# Patient Record
Sex: MALE | Race: BLACK OR AFRICAN AMERICAN | URBAN - METROPOLITAN AREA
[De-identification: names, ages, dates, MRNs, and addresses within clinical notes are randomized per-mention and may not be internally consistent; named-entity substitution may affect disease eponyms.]

---

## 2020-07-25 ENCOUNTER — TELEPHONE ENCOUNTER (OUTPATIENT)
Dept: URBAN - METROPOLITAN AREA CLINIC 13 | Facility: CLINIC | Age: 60
End: 2020-07-25

## 2020-07-26 ENCOUNTER — TELEPHONE ENCOUNTER (OUTPATIENT)
Dept: URBAN - METROPOLITAN AREA CLINIC 13 | Facility: CLINIC | Age: 60
End: 2020-07-26

## 2021-06-01 ENCOUNTER — TELEPHONE ENCOUNTER (OUTPATIENT)
Dept: URBAN - METROPOLITAN AREA CLINIC 113 | Facility: CLINIC | Age: 61
End: 2021-06-01

## 2021-09-14 ENCOUNTER — OFFICE VISIT (OUTPATIENT)
Dept: URBAN - METROPOLITAN AREA CLINIC 107 | Facility: CLINIC | Age: 61
End: 2021-09-14
Payer: MEDICARE

## 2021-09-14 ENCOUNTER — WEB ENCOUNTER (OUTPATIENT)
Dept: URBAN - METROPOLITAN AREA CLINIC 107 | Facility: CLINIC | Age: 61
End: 2021-09-14

## 2021-09-14 ENCOUNTER — LAB OUTSIDE AN ENCOUNTER (OUTPATIENT)
Dept: URBAN - METROPOLITAN AREA CLINIC 107 | Facility: CLINIC | Age: 61
End: 2021-09-14

## 2021-09-14 VITALS
TEMPERATURE: 98.3 F | BODY MASS INDEX: 24.48 KG/M2 | DIASTOLIC BLOOD PRESSURE: 88 MMHG | HEIGHT: 67 IN | WEIGHT: 156 LBS | HEART RATE: 76 BPM | SYSTOLIC BLOOD PRESSURE: 150 MMHG

## 2021-09-14 DIAGNOSIS — K21.9 GASTROESOPHAGEAL REFLUX DISEASE WITHOUT ESOPHAGITIS: ICD-10-CM

## 2021-09-14 DIAGNOSIS — K59.01 SLOW TRANSIT CONSTIPATION: ICD-10-CM

## 2021-09-14 DIAGNOSIS — R10.13 EPIGASTRIC ABDOMINAL PAIN: ICD-10-CM

## 2021-09-14 PROCEDURE — 99204 OFFICE O/P NEW MOD 45 MIN: CPT | Performed by: INTERNAL MEDICINE

## 2021-09-14 RX ORDER — PANTOPRAZOLE SODIUM 40 MG/1
1 TABLET TABLET, DELAYED RELEASE ORAL ONCE A DAY
Status: ACTIVE | COMMUNITY

## 2021-09-14 RX ORDER — SUCRALFATE 1 G/1
1 TABLET ON AN EMPTY STOMACH TABLET ORAL 4 TIMES DAILY
Status: ACTIVE | COMMUNITY

## 2021-09-14 RX ORDER — TIZANIDINE 2 MG/1
1 TABLET AS NEEDED TABLET ORAL
Status: ACTIVE | COMMUNITY

## 2021-09-14 NOTE — PHYSICAL EXAM GASTROINTESTINAL
Abdomen , nondistended , normal bowel sounds, soft, epigastric tender; Liver and Spleen , no hepatosplenomegaly

## 2021-09-14 NOTE — HPI-TODAY'S VISIT:
Mr Schreiber is a 60-year-old gentleman with no reported chronic medical problems presenting for evaluation of epigastric abdominal pain and GERD.  He reports having burning in his chest over the past few months.  He also reports having a burning sensation in his epigastrium radiating up to his chest.  He feels that the burning in his chest causes him to feel overall weak.  He experiences symptoms 3 to 4 days/week.  He has woken from sleep with the burning chest pain.  He has tried with no significant benefit.  Pantoprazole and Carafate he has never had an EGD.  No shortness of breath, palpatations, and exertional symptoms. He also reports having history of constipation described as having 2 bowel movements per week.  His consistency of the stool varies depending on his diet.   he reports having 2 balance per week for as long as he can remember.  He denies any red blood per rectum.  Had a colonoscopy history of colon polyps or colorectal cancer.  He presents for evaluation of several months of worsening epigastric

## 2021-09-23 ENCOUNTER — CLAIMS CREATED FROM THE CLAIM WINDOW (OUTPATIENT)
Dept: URBAN - METROPOLITAN AREA CLINIC 4 | Facility: CLINIC | Age: 61
End: 2021-09-23
Payer: MEDICARE

## 2021-09-23 ENCOUNTER — OFFICE VISIT (OUTPATIENT)
Dept: URBAN - METROPOLITAN AREA SURGERY CENTER 25 | Facility: SURGERY CENTER | Age: 61
End: 2021-09-23
Payer: MEDICARE

## 2021-09-23 DIAGNOSIS — R10.13 ABDOMINAL PAIN, EPIGASTRIC: ICD-10-CM

## 2021-09-23 DIAGNOSIS — K31.89 GASTRIC FOVEOLAR HYPERPLASIA: ICD-10-CM

## 2021-09-23 DIAGNOSIS — K31.89 NONSURGICAL DUMPING SYNDROME: ICD-10-CM

## 2021-09-23 DIAGNOSIS — K22.8 OTHER SPECIFIED DISEASES OF ESOPHAGUS: ICD-10-CM

## 2021-09-23 PROCEDURE — G8907 PT DOC NO EVENTS ON DISCHARG: HCPCS | Performed by: INTERNAL MEDICINE

## 2021-09-23 PROCEDURE — 88305 TISSUE EXAM BY PATHOLOGIST: CPT | Performed by: PATHOLOGY

## 2021-09-23 PROCEDURE — 43239 EGD BIOPSY SINGLE/MULTIPLE: CPT | Performed by: INTERNAL MEDICINE

## 2021-09-23 PROCEDURE — 88312 SPECIAL STAINS GROUP 1: CPT | Performed by: PATHOLOGY

## 2021-09-23 RX ORDER — TIZANIDINE 2 MG/1
1 TABLET AS NEEDED TABLET ORAL
Status: ACTIVE | COMMUNITY

## 2021-09-23 RX ORDER — PANTOPRAZOLE SODIUM 40 MG/1
1 TABLET TABLET, DELAYED RELEASE ORAL ONCE A DAY
Status: ACTIVE | COMMUNITY

## 2021-09-23 RX ORDER — SUCRALFATE 1 G/1
1 TABLET ON AN EMPTY STOMACH TABLET ORAL 4 TIMES DAILY
Status: ACTIVE | COMMUNITY

## 2021-10-05 ENCOUNTER — TELEPHONE ENCOUNTER (OUTPATIENT)
Dept: URBAN - METROPOLITAN AREA SURGERY CENTER 30 | Facility: SURGERY CENTER | Age: 61
End: 2021-10-05

## 2021-10-18 ENCOUNTER — TELEPHONE ENCOUNTER (OUTPATIENT)
Dept: URBAN - METROPOLITAN AREA CLINIC 113 | Facility: CLINIC | Age: 61
End: 2021-10-18

## 2021-11-05 ENCOUNTER — WEB ENCOUNTER (OUTPATIENT)
Dept: URBAN - METROPOLITAN AREA CLINIC 113 | Facility: CLINIC | Age: 61
End: 2021-11-05

## 2021-11-05 ENCOUNTER — OFFICE VISIT (OUTPATIENT)
Dept: URBAN - METROPOLITAN AREA CLINIC 113 | Facility: CLINIC | Age: 61
End: 2021-11-05
Payer: MEDICARE

## 2021-11-05 VITALS
RESPIRATION RATE: 18 BRPM | HEIGHT: 67 IN | HEART RATE: 65 BPM | BODY MASS INDEX: 24.64 KG/M2 | WEIGHT: 157 LBS | SYSTOLIC BLOOD PRESSURE: 145 MMHG | DIASTOLIC BLOOD PRESSURE: 80 MMHG | TEMPERATURE: 98.1 F

## 2021-11-05 DIAGNOSIS — K21.9 GASTROESOPHAGEAL REFLUX DISEASE WITHOUT ESOPHAGITIS: ICD-10-CM

## 2021-11-05 DIAGNOSIS — R10.13 EPIGASTRIC ABDOMINAL PAIN: ICD-10-CM

## 2021-11-05 DIAGNOSIS — R10.11 RIGHT UPPER QUADRANT ABDOMINAL PAIN: ICD-10-CM

## 2021-11-05 DIAGNOSIS — K59.01 SLOW TRANSIT CONSTIPATION: ICD-10-CM

## 2021-11-05 PROCEDURE — 99214 OFFICE O/P EST MOD 30 MIN: CPT | Performed by: NURSE PRACTITIONER

## 2021-11-05 RX ORDER — TIZANIDINE 2 MG/1
1 TABLET AS NEEDED TABLET ORAL
Status: ACTIVE | COMMUNITY

## 2021-11-05 RX ORDER — ESOMEPRAZOLE MAGNESIUM 40 MG/1
1 CAPSULE CAPSULE, DELAYED RELEASE ORAL ONCE A DAY
Qty: 90 | Refills: 3 | OUTPATIENT
Start: 2021-11-05

## 2021-11-05 RX ORDER — ESOMEPRAZOLE MAGNESIUM 40 MG/1
1 CAPSULE CAPSULE, DELAYED RELEASE ORAL ONCE A DAY
Status: ON HOLD | COMMUNITY

## 2021-11-05 RX ORDER — SUCRALFATE 1 G/1
1 TABLET ON AN EMPTY STOMACH TABLET ORAL 4 TIMES DAILY
Status: DISCONTINUED | COMMUNITY

## 2021-11-05 RX ORDER — DEXLANSOPRAZOLE 60 MG/1
1 CAPSULE CAPSULE, DELAYED RELEASE ORAL ONCE A DAY
Status: ACTIVE | COMMUNITY

## 2021-11-05 RX ORDER — PANTOPRAZOLE SODIUM 40 MG/1
1 TABLET TABLET, DELAYED RELEASE ORAL ONCE A DAY
Status: DISCONTINUED | COMMUNITY

## 2021-11-05 NOTE — HPI-TODAY'S VISIT:
61-year-old gentleman presenting for follow-up after EGD. He was last seen 9/14/2021 for evaluation of burning epigastric and lower chest pain, unimproved with pantoprazole and Carafate.  An EGD was planned.  Regarding chronic constipation, he was recommended a daily bowel regimen with MiraLAX.  A screening colonoscopy was recommended in the near future. EGD was performed on 9/23/2021.  Findings included an irregular Z-line and nonerosive gastropathy.  Gastric biopsy showed foveolar hyperplasia, negative for H. pylori. He experienced "horrible" epigastic pain October 18th-21st, described as a 7-9 on a scale of 1-10. He complains of constant burning which persisted throughout the day. This has lessened, but still occurs. The burning is often worsened on an empty stomach and subsides with eating. He states he often consumes smaller portions because he is bloating. He denies nausea or vomiting. No heartburn or regurgitation. His symptoms have minimally changed with the Dexilant samples. He complains of significant fatigue, stating he has difficulty regaining his strength after severe episodes of abdominal pain. He is having an average of 3 nonbloody stools per week. MiraLAX or prunes are helpful when needed, but he is not consistent with use. He had labs earlier today with his PCP, and is interested in pursuing imaging as suggested during a previous phone call to our office. He wants to get to the "cause" of his ongoing symptoms. Of note, he does admit to frequent use of Aleve for back and knee pain.

## 2021-12-10 ENCOUNTER — OFFICE VISIT (OUTPATIENT)
Dept: URBAN - METROPOLITAN AREA CLINIC 113 | Facility: CLINIC | Age: 61
End: 2021-12-10
Payer: MEDICARE

## 2021-12-10 VITALS
TEMPERATURE: 98.2 F | HEART RATE: 72 BPM | HEIGHT: 67 IN | WEIGHT: 156 LBS | RESPIRATION RATE: 18 BRPM | DIASTOLIC BLOOD PRESSURE: 92 MMHG | BODY MASS INDEX: 24.48 KG/M2 | SYSTOLIC BLOOD PRESSURE: 161 MMHG

## 2021-12-10 DIAGNOSIS — K21.9 GASTROESOPHAGEAL REFLUX DISEASE WITHOUT ESOPHAGITIS: ICD-10-CM

## 2021-12-10 DIAGNOSIS — K59.01 SLOW TRANSIT CONSTIPATION: ICD-10-CM

## 2021-12-10 DIAGNOSIS — R10.13 EPIGASTRIC ABDOMINAL PAIN: ICD-10-CM

## 2021-12-10 PROCEDURE — 99213 OFFICE O/P EST LOW 20 MIN: CPT | Performed by: INTERNAL MEDICINE

## 2021-12-10 RX ORDER — LORATADINE 10 MG
AS DIRECTED TABLET,DISINTEGRATING ORAL ONCE A DAY
OUTPATIENT

## 2021-12-10 RX ORDER — ESOMEPRAZOLE MAGNESIUM 40 MG/1
1 CAPSULE CAPSULE, DELAYED RELEASE ORAL ONCE A DAY
Qty: 90 | Refills: 3 | Status: ACTIVE | COMMUNITY
Start: 2021-11-05

## 2021-12-10 RX ORDER — LORATADINE 10 MG
AS DIRECTED TABLET,DISINTEGRATING ORAL ONCE A DAY
Status: ACTIVE | COMMUNITY

## 2021-12-10 RX ORDER — ESOMEPRAZOLE MAGNESIUM 40 MG/1
1 CAPSULE CAPSULE, DELAYED RELEASE ORAL ONCE A DAY
Status: DISCONTINUED | COMMUNITY

## 2021-12-10 RX ORDER — DEXLANSOPRAZOLE 60 MG/1
1 CAPSULE CAPSULE, DELAYED RELEASE ORAL ONCE A DAY
Status: DISCONTINUED | COMMUNITY

## 2021-12-10 RX ORDER — ESOMEPRAZOLE MAGNESIUM 40 MG/1
1 CAPSULE CAPSULE, DELAYED RELEASE ORAL ONCE A DAY
OUTPATIENT
Start: 2021-11-05

## 2021-12-10 RX ORDER — TIZANIDINE 2 MG/1
1 TABLET AS NEEDED TABLET ORAL
Status: DISCONTINUED | COMMUNITY

## 2021-12-10 NOTE — HPI-TODAY'S VISIT:
Mr. Schreiber is a 61-year-old gentleman presenting for follow up regarding GERD, constipation, and excessive bloating. He was last seen on 11/5/2021 for follow-up after undergoing EGD for evaluation of epigastric abdominal pain, right upper quadrant pain, GERD and constipation.  He was recommended to begin esomeprazole 40 mg daily for GERD, increase MiraLAX to daily for constipation, and obtain a CT abdomen pelvis with contrast.  Labs on 11/5/2021 show normal CBC, basic metabolic panel, liver function tests, hemoglobin A1c, TSH 1.379.  The CT abdomen pelvis with contrast on 12/6/2021 reported a suggestion of distal esophageal thickening questioning underlying esophagitis and a 2 mm left renal stone.  The CT was otherwise unremarkable.  He currently reports having a wide range of GI symptoms including some difficulty initiating swallowing described as coughing with both liquids and solids.  He otherwise denies any difficulty swallowing.  He experiences lower chest and epigastric pressure and burning sensation has been worse over the past week.  He takes esomeprazole 40 mg daily with improvement.  He reports no significant improvement with Dexilant 60 mg daily compared to esomeprazole 40 mg daily.  His constipation and bloating is overall improved using MiraLAX daily as needed.  He denies any melena or red blood per rectum.  No NSAID use and no vomiting.

## 2021-12-11 ENCOUNTER — TELEPHONE ENCOUNTER (OUTPATIENT)
Dept: URBAN - METROPOLITAN AREA CLINIC 113 | Facility: CLINIC | Age: 61
End: 2021-12-11

## 2021-12-11 LAB
A/G RATIO: 1.8
ALBUMIN: 4.9
ALKALINE PHOSPHATASE: 84
ALT (SGPT): 54
AMYLASE: 94
AST (SGOT): 27
BILIRUBIN, TOTAL: 0.5
BUN/CREATININE RATIO: 19
BUN: 17
CALCIUM: 9.3
CARBON DIOXIDE, TOTAL: 24
CHLORIDE: 101
CREATININE: 0.88
EGFR IF AFRICN AM: 106
EGFR IF NONAFRICN AM: 91
GLOBULIN, TOTAL: 2.8
GLUCOSE: 105
LIPASE: 32
POTASSIUM: 4.2
PROTEIN, TOTAL: 7.7
SODIUM: 138

## 2021-12-22 PROBLEM — 35298007: Status: ACTIVE | Noted: 2021-10-01

## 2021-12-22 PROBLEM — 266435005: Status: ACTIVE | Noted: 2021-09-14

## 2021-12-22 PROBLEM — 79922009: Status: ACTIVE | Noted: 2021-10-01

## 2022-06-16 NOTE — HPI-OTHER HISTORIES
EGD (9/23/2021): irregular Z-line and nonerosive gastropathy.  Gastric biopsy showed foveolar hyperplasia, negative for H. pylori.
no

## 2023-10-09 ENCOUNTER — OFFICE VISIT (OUTPATIENT)
Dept: URBAN - METROPOLITAN AREA CLINIC 113 | Facility: CLINIC | Age: 63
End: 2023-10-09
Payer: MEDICARE

## 2023-10-09 ENCOUNTER — TELEPHONE ENCOUNTER (OUTPATIENT)
Dept: URBAN - METROPOLITAN AREA CLINIC 13 | Facility: CLINIC | Age: 63
End: 2023-10-09

## 2023-10-09 ENCOUNTER — LAB OUTSIDE AN ENCOUNTER (OUTPATIENT)
Dept: URBAN - METROPOLITAN AREA CLINIC 113 | Facility: CLINIC | Age: 63
End: 2023-10-09

## 2023-10-09 VITALS
HEART RATE: 100 BPM | HEIGHT: 67 IN | DIASTOLIC BLOOD PRESSURE: 77 MMHG | SYSTOLIC BLOOD PRESSURE: 119 MMHG | TEMPERATURE: 97.7 F | WEIGHT: 166 LBS | BODY MASS INDEX: 26.06 KG/M2 | RESPIRATION RATE: 14 BRPM

## 2023-10-09 DIAGNOSIS — K21.9 ACID REFLUX: ICD-10-CM

## 2023-10-09 DIAGNOSIS — R07.89 ATYPICAL CHEST PAIN: ICD-10-CM

## 2023-10-09 DIAGNOSIS — R14.0 ABDOMINAL BLOATING: ICD-10-CM

## 2023-10-09 DIAGNOSIS — R19.4 CHANGE IN BOWEL HABITS: ICD-10-CM

## 2023-10-09 PROBLEM — 235595009: Status: ACTIVE | Noted: 2023-10-09

## 2023-10-09 PROCEDURE — 99214 OFFICE O/P EST MOD 30 MIN: CPT

## 2023-10-09 PROCEDURE — 99244 OFF/OP CNSLTJ NEW/EST MOD 40: CPT

## 2023-10-09 RX ORDER — DAPAGLIFLOZIN 10 MG/1
1 TABLET TABLET, FILM COATED ORAL ONCE A DAY
Status: ACTIVE | COMMUNITY

## 2023-10-09 RX ORDER — LORATADINE 10 MG
AS DIRECTED TABLET,DISINTEGRATING ORAL ONCE A DAY
Status: ACTIVE | COMMUNITY

## 2023-10-09 RX ORDER — METRONIDAZOLE 250 MG/1
1 TABLET TABLET ORAL THREE TIMES A DAY
Qty: 30 TABLET | Refills: 0 | OUTPATIENT
Start: 2023-10-09 | End: 2023-10-19

## 2023-10-09 RX ORDER — ESOMEPRAZOLE MAGNESIUM 40 MG/1
1 CAPSULE CAPSULE, DELAYED RELEASE ORAL TWICE DAILY
Qty: 180 | Refills: 1 | OUTPATIENT
Start: 2021-11-05

## 2023-10-09 RX ORDER — NORTRIPTYLINE HYDROCHLORIDE 25 MG/1
1 CAPSULE CAPSULE ORAL ONCE A DAY
Status: ACTIVE | COMMUNITY

## 2023-10-09 RX ORDER — ESOMEPRAZOLE MAGNESIUM 40 MG/1
1 CAPSULE CAPSULE, DELAYED RELEASE ORAL ONCE A DAY
Status: ACTIVE | COMMUNITY
Start: 2021-11-05

## 2023-10-09 RX ORDER — ESOMEPRAZOLE MAGNESIUM 40 MG/1
1 CAPSULE CAPSULE, DELAYED RELEASE ORAL ONCE A DAY
Status: ACTIVE | COMMUNITY

## 2023-10-09 RX ORDER — SPIRONOLACTONE 25 MG/1
1 TABLET TABLET, FILM COATED ORAL
Status: ACTIVE | COMMUNITY

## 2023-10-09 RX ORDER — RIVAROXABAN 2.5 MG/1
1 TABLET TABLET, FILM COATED ORAL TWICE A DAY
Status: ACTIVE | COMMUNITY

## 2023-10-09 RX ORDER — SACUBITRIL AND VALSARTAN 49; 51 MG/1; MG/1
1 TABLET TABLET, FILM COATED ORAL TWICE A DAY
Status: ACTIVE | COMMUNITY

## 2023-10-09 NOTE — HPI-OTHER HISTORIES
EGD (9/23/2021): irregular Z-line and nonerosive gastropathy.  Gastric biopsy showed foveolar hyperplasia, negative for H. pylori.

## 2023-10-09 NOTE — HPI-TODAY'S VISIT:
62-year-old male with a history of hypertension, GERD, constipation and bloating presents for long interval follow-up and same-day appointment.  He has been referred by Millie butler PA-C for evaluation of chest pain, nausea and elevated liver enzymes.  A copy of today's visit will be forwarded to the referring provider. He was last seen on 12/10/2021.  He underwent EGD 10/5/2021 which was essentially unremarkable.  His distal esophageal thickening seen on prior CT imaging was likely secondary to reflux esophagitis.  He was encouraged to increase his omeprazole to 40 mg once daily.  His constipation and bloating are likely secondary to functional GI disorder.  He was to continue MiraLAX 1 capful daily along with high-fiber diet.  Although clinical suspicion was low he was planned for labs to rule out hepatobiliary or pancreatic disease. Labs on 12/10/2021 revealed normal amylase, normal lipase unremarkable CMP with exception of ALT 54. Per the referral note, he experienced chest pain while in [place] last month.  He was seen at [facility] in [place] 9/23/2023.  Cardiac evaluation was normal.  He was given a GI cocktail and IV fluids and was to follow-up with his PCP and cardiologist.  He is established with Dr. Blanco again. Labs 8/29/2023:Normal WBC, normal H/H, normal MCV, normal platelet count, normal total bilirubin, normal AST, ALT of 65, normal alkaline phosphatase, elevated cholesterol 213, normal triglycerides, low vitamin D of 25.7, normal TSH.  He had an MI in April 2022. He had one stent placed hours prior to his MI. He then had a second stent placed after his MI. He has seen Dr. Anderson who has recommended a 14-day heart monitor. He is awaiting his monitor in the mail. He is being considered for a pacemaker. His EF is about 40%.   The chest pain does not occur with exertion. He does not notice a correlation with food. Prior to his chest pains starting, he was experiencing excessive flatulence. This as a foul odor. He does also admit to painful burning in his epigastrium. This fluctuates with severity. Food does not worsen the pain. He is not sure if this is affected by his bowel movements. He had similar complaints of chest pain in the past which improved with Esomeprazole.   He did have five bowel movements throughout the night last night. This comes intermittently. He has had a diarrhea predominant change in bowel habits for the last several months. He typically goes days without a bowel movement. He has had a soft bowel movement daily if not several loose stools per day. He has had intermittent blood per rectum seen on the toilet paper. He has never had a colonoscopy. He admits to excess bloating. He does have intermittent upper abdominal pain. This may radiate to his RLQ or LLQ. Denies nausea or vomiting. He does still have his gallbladder.

## 2023-10-10 ENCOUNTER — TELEPHONE ENCOUNTER (OUTPATIENT)
Dept: URBAN - METROPOLITAN AREA CLINIC 113 | Facility: CLINIC | Age: 63
End: 2023-10-10

## 2023-10-10 LAB
A/G RATIO: 1.7
ABSOLUTE BASOPHILS: 20
ABSOLUTE EOSINOPHILS: 41
ABSOLUTE LYMPHOCYTES: 1382
ABSOLUTE MONOCYTES: 428
ABSOLUTE NEUTROPHILS: 3228
ALBUMIN: 4.5
ALKALINE PHOSPHATASE: 60
ALT (SGPT): 39
AST (SGOT): 24
BASOPHILS: 0.4
BILIRUBIN, TOTAL: 0.5
BUN/CREATININE RATIO: (no result)
BUN: 15
CALCIUM: 9.4
CARBON DIOXIDE, TOTAL: 22
CHLORIDE: 103
CREATININE: 1.02
EGFR: 82
EOSINOPHILS: 0.8
GLOBULIN, TOTAL: 2.7
GLUCOSE: 108
HEMATOCRIT: 45.8
HEMOGLOBIN: 15.3
LIPASE: 31
LYMPHOCYTES: 27.1
MCH: 29.8
MCHC: 33.4
MCV: 89.1
MONOCYTES: 8.4
MPV: 10.9
NEUTROPHILS: 63.3
PLATELET COUNT: 255
POTASSIUM: 4.1
PROTEIN, TOTAL: 7.2
RDW: 13.6
RED BLOOD CELL COUNT: 5.14
SODIUM: 137
WHITE BLOOD CELL COUNT: 5.1

## 2023-10-13 ENCOUNTER — TELEPHONE ENCOUNTER (OUTPATIENT)
Dept: URBAN - METROPOLITAN AREA CLINIC 113 | Facility: CLINIC | Age: 63
End: 2023-10-13

## 2023-10-17 LAB
CALPROTECTIN, FECAL: 340
CAMPYLOBACTER SPP. AG,EIA: (no result)
CLOSTRIDIUM DIFFICILE: (no result)
GIARDIA AG, EIA, STOOL: (no result)
OVA AND PARASITES, CONC AND PERM SMEAR: (no result)
SALMONELLA AND SHIGELLA, CULTURE: (no result)
SHIGA TOXINS, EIA W/RFL TO E.COLI O157 CULTURE: (no result)

## 2023-10-18 ENCOUNTER — TELEPHONE ENCOUNTER (OUTPATIENT)
Dept: URBAN - METROPOLITAN AREA CLINIC 113 | Facility: CLINIC | Age: 63
End: 2023-10-18

## 2023-10-20 ENCOUNTER — TELEPHONE ENCOUNTER (OUTPATIENT)
Dept: URBAN - METROPOLITAN AREA CLINIC 113 | Facility: CLINIC | Age: 63
End: 2023-10-20

## 2023-10-24 ENCOUNTER — TELEPHONE ENCOUNTER (OUTPATIENT)
Dept: URBAN - METROPOLITAN AREA CLINIC 113 | Facility: CLINIC | Age: 63
End: 2023-10-24

## 2023-11-13 ENCOUNTER — OFFICE VISIT (OUTPATIENT)
Dept: URBAN - METROPOLITAN AREA SURGERY CENTER 25 | Facility: SURGERY CENTER | Age: 63
End: 2023-11-13

## 2023-11-13 ENCOUNTER — TELEPHONE ENCOUNTER (OUTPATIENT)
Dept: URBAN - METROPOLITAN AREA CLINIC 113 | Facility: CLINIC | Age: 63
End: 2023-11-13

## 2023-12-11 ENCOUNTER — OUT OF OFFICE VISIT (OUTPATIENT)
Dept: URBAN - METROPOLITAN AREA SURGERY CENTER 25 | Facility: SURGERY CENTER | Age: 63
End: 2023-12-11
Payer: MEDICARE

## 2023-12-11 ENCOUNTER — CLAIMS CREATED FROM THE CLAIM WINDOW (OUTPATIENT)
Dept: URBAN - METROPOLITAN AREA CLINIC 4 | Facility: CLINIC | Age: 63
End: 2023-12-11
Payer: MEDICARE

## 2023-12-11 DIAGNOSIS — D12.3 ADENOMA OF TRANSVERSE COLON: ICD-10-CM

## 2023-12-11 DIAGNOSIS — D12.3 ADENOMATOUS POLYP OF TRANSVERSE COLON: ICD-10-CM

## 2023-12-11 DIAGNOSIS — R10.84 GENERALIZED ABDOMINAL PAIN: ICD-10-CM

## 2023-12-11 DIAGNOSIS — R19.7 DIARRHEA, UNSPECIFIED: ICD-10-CM

## 2023-12-11 DIAGNOSIS — R10.84 ABDOMINAL CRAMPING, GENERALIZED: ICD-10-CM

## 2023-12-11 DIAGNOSIS — R19.7 ACUTE DIARRHEA: ICD-10-CM

## 2023-12-11 DIAGNOSIS — K63.89 OTHER SPECIFIED DISEASES OF INTESTINE: ICD-10-CM

## 2023-12-11 DIAGNOSIS — K63.89 APPENDICITIS EPIPLOICA: ICD-10-CM

## 2023-12-11 DIAGNOSIS — D12.3 BENIGN NEOPLASM OF TRANSVERSE COLON: ICD-10-CM

## 2023-12-11 PROCEDURE — 88342 IMHCHEM/IMCYTCHM 1ST ANTB: CPT | Performed by: PATHOLOGY

## 2023-12-11 PROCEDURE — 45385 COLONOSCOPY W/LESION REMOVAL: CPT | Performed by: INTERNAL MEDICINE

## 2023-12-11 PROCEDURE — 00811 ANES LWR INTST NDSC NOS: CPT | Performed by: ANESTHESIOLOGY

## 2023-12-11 PROCEDURE — G8907 PT DOC NO EVENTS ON DISCHARG: HCPCS | Performed by: CLINIC/CENTER

## 2023-12-11 PROCEDURE — 88305 TISSUE EXAM BY PATHOLOGIST: CPT | Performed by: PATHOLOGY

## 2023-12-11 PROCEDURE — 45380 COLONOSCOPY AND BIOPSY: CPT | Performed by: CLINIC/CENTER

## 2023-12-11 PROCEDURE — 45385 COLONOSCOPY W/LESION REMOVAL: CPT | Performed by: CLINIC/CENTER

## 2023-12-11 PROCEDURE — 88313 SPECIAL STAINS GROUP 2: CPT | Performed by: PATHOLOGY

## 2023-12-11 PROCEDURE — 45380 COLONOSCOPY AND BIOPSY: CPT | Performed by: INTERNAL MEDICINE

## 2023-12-11 RX ORDER — LORATADINE 10 MG
AS DIRECTED TABLET,DISINTEGRATING ORAL ONCE A DAY
Status: ACTIVE | COMMUNITY

## 2023-12-11 RX ORDER — DAPAGLIFLOZIN 10 MG/1
1 TABLET TABLET, FILM COATED ORAL ONCE A DAY
Status: ACTIVE | COMMUNITY

## 2023-12-11 RX ORDER — ESOMEPRAZOLE MAGNESIUM 40 MG/1
1 CAPSULE CAPSULE, DELAYED RELEASE ORAL ONCE A DAY
Status: ACTIVE | COMMUNITY

## 2023-12-11 RX ORDER — RIVAROXABAN 2.5 MG/1
1 TABLET TABLET, FILM COATED ORAL TWICE A DAY
Status: ACTIVE | COMMUNITY

## 2023-12-11 RX ORDER — ESOMEPRAZOLE MAGNESIUM 40 MG/1
1 CAPSULE CAPSULE, DELAYED RELEASE ORAL TWICE DAILY
Qty: 180 | Refills: 1 | Status: ACTIVE | COMMUNITY
Start: 2021-11-05

## 2023-12-11 RX ORDER — SACUBITRIL AND VALSARTAN 49; 51 MG/1; MG/1
1 TABLET TABLET, FILM COATED ORAL TWICE A DAY
Status: ACTIVE | COMMUNITY

## 2023-12-11 RX ORDER — SPIRONOLACTONE 25 MG/1
1 TABLET TABLET, FILM COATED ORAL
Status: ACTIVE | COMMUNITY

## 2023-12-11 RX ORDER — NORTRIPTYLINE HYDROCHLORIDE 25 MG/1
1 CAPSULE CAPSULE ORAL ONCE A DAY
Status: ACTIVE | COMMUNITY

## 2023-12-20 ENCOUNTER — TELEPHONE ENCOUNTER (OUTPATIENT)
Dept: URBAN - METROPOLITAN AREA CLINIC 113 | Facility: CLINIC | Age: 63
End: 2023-12-20

## 2024-01-22 ENCOUNTER — OFFICE VISIT (OUTPATIENT)
Dept: URBAN - METROPOLITAN AREA CLINIC 113 | Facility: CLINIC | Age: 64
End: 2024-01-22
Payer: MEDICARE

## 2024-01-22 VITALS
RESPIRATION RATE: 18 BRPM | BODY MASS INDEX: 25.74 KG/M2 | HEART RATE: 99 BPM | WEIGHT: 164 LBS | TEMPERATURE: 97.3 F | SYSTOLIC BLOOD PRESSURE: 106 MMHG | DIASTOLIC BLOOD PRESSURE: 73 MMHG | HEIGHT: 67 IN

## 2024-01-22 DIAGNOSIS — R10.13 EPIGASTRIC ABDOMINAL PAIN: ICD-10-CM

## 2024-01-22 DIAGNOSIS — R13.19 ESOPHAGEAL DYSPHAGIA: ICD-10-CM

## 2024-01-22 DIAGNOSIS — R07.89 ATYPICAL CHEST PAIN: ICD-10-CM

## 2024-01-22 DIAGNOSIS — Z79.01 CHRONIC ANTICOAGULATION: ICD-10-CM

## 2024-01-22 PROCEDURE — 99214 OFFICE O/P EST MOD 30 MIN: CPT | Performed by: INTERNAL MEDICINE

## 2024-01-22 RX ORDER — ESOMEPRAZOLE MAGNESIUM 40 MG/1
1 CAPSULE CAPSULE, DELAYED RELEASE ORAL TWICE DAILY
Qty: 180 | Refills: 1 | Status: ACTIVE | COMMUNITY
Start: 2021-11-05

## 2024-01-22 RX ORDER — SACUBITRIL AND VALSARTAN 49; 51 MG/1; MG/1
1 TABLET TABLET, FILM COATED ORAL TWICE A DAY
Status: ACTIVE | COMMUNITY

## 2024-01-22 RX ORDER — SPIRONOLACTONE 25 MG/1
1 TABLET TABLET, FILM COATED ORAL
Status: ACTIVE | COMMUNITY

## 2024-01-22 RX ORDER — TIZANIDINE HYDROCHLORIDE 4 MG/1
1 CAPSULE AS NEEDED CAPSULE, GELATIN COATED ORAL THREE TIMES A DAY
Status: ACTIVE | COMMUNITY

## 2024-01-22 RX ORDER — ESOMEPRAZOLE MAGNESIUM 40 MG/1
1 CAPSULE CAPSULE, DELAYED RELEASE ORAL TWICE DAILY
OUTPATIENT
Start: 2021-11-05

## 2024-01-22 RX ORDER — DAPAGLIFLOZIN 10 MG/1
1 TABLET TABLET, FILM COATED ORAL ONCE A DAY
Status: ACTIVE | COMMUNITY

## 2024-01-22 RX ORDER — ESOMEPRAZOLE MAGNESIUM 40 MG/1
1 CAPSULE CAPSULE, DELAYED RELEASE ORAL ONCE A DAY
Status: ON HOLD | COMMUNITY

## 2024-01-22 RX ORDER — NORTRIPTYLINE HYDROCHLORIDE 25 MG/1
1 CAPSULE CAPSULE ORAL ONCE A DAY
Status: ACTIVE | COMMUNITY

## 2024-01-22 RX ORDER — RIVAROXABAN 2.5 MG/1
1 TABLET TABLET, FILM COATED ORAL TWICE A DAY
Status: ACTIVE | COMMUNITY

## 2024-01-22 RX ORDER — LORATADINE 10 MG
AS DIRECTED TABLET,DISINTEGRATING ORAL ONCE A DAY
Status: ON HOLD | COMMUNITY

## 2024-01-22 NOTE — HPI-TODAY'S VISIT:
Mr. Schreiber is a 63-year-old gentleman with past medical history significant for coronary artery disease status post MI and PTCA with stents, atrial fibrillation on Xarelto, and cholecystectomy presenting for follow-up after undergoing colonoscopy with epigastric and lower chest pain.  The colonoscopy was performed on 12/11/2023 for the evaluation of abdominal pain and diarrhea.  The findings were notable for removal of a 5 mm hepatic flexure tubular adenoma but otherwise normal small bowel and colon status post biopsies of the colon negative for microscopic colitis.  Since that visit he saw Dr. Shaffer and underwent a cholecystectomy on 12/19/2023 for chronic cholecystitis/cholelithiasis.  He reports having some issues of epigastric and lower chest pain with some difficulty swallowing solids more than liquids.  He has been taking Nexium before meals twice daily with minimal improvement.  He denies any heartburn or regurgitation.  He has tried Gas-X with some benefit.  He denies any NSAID use.  He is having 2-3 bowel movements per week with no melena or red blood per rectum.  An EGD on 9/23/2021 for the evaluation of GERD and epigastric pain that was unremarkable except for mild gastric erythema status post biopsies negative for H. pylori.

## 2024-02-05 PROBLEM — 40890009: Status: ACTIVE | Noted: 2024-02-05

## 2024-02-05 PROBLEM — 102589003: Status: ACTIVE | Noted: 2024-02-05

## 2024-02-08 PROBLEM — 711150003: Status: ACTIVE | Noted: 2024-02-08

## 2024-04-10 ENCOUNTER — EGD (OUTPATIENT)
Dept: URBAN - METROPOLITAN AREA SURGERY CENTER 25 | Facility: SURGERY CENTER | Age: 64
End: 2024-04-10
Payer: MEDICARE

## 2024-04-10 ENCOUNTER — LAB (OUTPATIENT)
Dept: URBAN - METROPOLITAN AREA CLINIC 4 | Facility: CLINIC | Age: 64
End: 2024-04-10
Payer: MEDICARE

## 2024-04-10 DIAGNOSIS — K29.70 GASTRITIS, UNSPECIFIED, WITHOUT BLEEDING: ICD-10-CM

## 2024-04-10 DIAGNOSIS — R13.19 OTHER DYSPHAGIA: ICD-10-CM

## 2024-04-10 DIAGNOSIS — R10.13 EPIGASTRIC ABDOMINAL PAIN: ICD-10-CM

## 2024-04-10 DIAGNOSIS — K22.2 ACQUIRED ESOPHAGEAL RING: ICD-10-CM

## 2024-04-10 DIAGNOSIS — K31.89 OTHER DISEASES OF STOMACH AND DUODENUM: ICD-10-CM

## 2024-04-10 PROCEDURE — 43239 EGD BIOPSY SINGLE/MULTIPLE: CPT | Performed by: INTERNAL MEDICINE

## 2024-04-10 PROCEDURE — 88312 SPECIAL STAINS GROUP 1: CPT | Performed by: PATHOLOGY

## 2024-04-10 PROCEDURE — 43248 EGD GUIDE WIRE INSERTION: CPT | Performed by: INTERNAL MEDICINE

## 2024-04-10 PROCEDURE — 88305 TISSUE EXAM BY PATHOLOGIST: CPT | Performed by: PATHOLOGY

## 2024-04-10 RX ORDER — RIVAROXABAN 2.5 MG/1
1 TABLET TABLET, FILM COATED ORAL TWICE A DAY
Status: ACTIVE | COMMUNITY

## 2024-04-10 RX ORDER — DAPAGLIFLOZIN 10 MG/1
1 TABLET TABLET, FILM COATED ORAL ONCE A DAY
Status: ACTIVE | COMMUNITY

## 2024-04-10 RX ORDER — SACUBITRIL AND VALSARTAN 49; 51 MG/1; MG/1
1 TABLET TABLET, FILM COATED ORAL TWICE A DAY
Status: ACTIVE | COMMUNITY

## 2024-04-10 RX ORDER — TIZANIDINE HYDROCHLORIDE 4 MG/1
1 CAPSULE AS NEEDED CAPSULE, GELATIN COATED ORAL THREE TIMES A DAY
Status: ACTIVE | COMMUNITY

## 2024-04-10 RX ORDER — NORTRIPTYLINE HYDROCHLORIDE 25 MG/1
1 CAPSULE CAPSULE ORAL ONCE A DAY
Status: ACTIVE | COMMUNITY

## 2024-04-10 RX ORDER — LORATADINE 10 MG
AS DIRECTED TABLET,DISINTEGRATING ORAL ONCE A DAY
Status: ON HOLD | COMMUNITY

## 2024-04-10 RX ORDER — ESOMEPRAZOLE MAGNESIUM 40 MG/1
1 CAPSULE CAPSULE, DELAYED RELEASE ORAL ONCE A DAY
Status: ON HOLD | COMMUNITY

## 2024-04-10 RX ORDER — ESOMEPRAZOLE MAGNESIUM 40 MG/1
1 CAPSULE CAPSULE, DELAYED RELEASE ORAL TWICE DAILY
Status: ACTIVE | COMMUNITY
Start: 2021-11-05

## 2024-04-10 RX ORDER — SPIRONOLACTONE 25 MG/1
1 TABLET TABLET, FILM COATED ORAL
Status: ACTIVE | COMMUNITY

## 2024-04-30 ENCOUNTER — OV EP (OUTPATIENT)
Dept: URBAN - METROPOLITAN AREA CLINIC 113 | Facility: CLINIC | Age: 64
End: 2024-04-30

## 2024-04-30 VITALS
RESPIRATION RATE: 18 BRPM | DIASTOLIC BLOOD PRESSURE: 67 MMHG | WEIGHT: 162 LBS | TEMPERATURE: 97.7 F | HEART RATE: 76 BPM | SYSTOLIC BLOOD PRESSURE: 119 MMHG | BODY MASS INDEX: 25.43 KG/M2 | HEIGHT: 67 IN

## 2024-04-30 RX ORDER — ESOMEPRAZOLE MAGNESIUM 40 MG/1
1 CAPSULE CAPSULE, DELAYED RELEASE ORAL ONCE A DAY
Status: ON HOLD | COMMUNITY

## 2024-04-30 RX ORDER — ESOMEPRAZOLE MAGNESIUM 40 MG/1
1 CAPSULE CAPSULE, DELAYED RELEASE ORAL TWICE DAILY
Qty: 60 | Refills: 11
Start: 2021-11-05

## 2024-04-30 RX ORDER — ESOMEPRAZOLE MAGNESIUM 40 MG/1
1 CAPSULE CAPSULE, DELAYED RELEASE ORAL TWICE DAILY
Status: ACTIVE | COMMUNITY
Start: 2021-11-05

## 2024-04-30 RX ORDER — RIVAROXABAN 2.5 MG/1
1 TABLET TABLET, FILM COATED ORAL TWICE A DAY
Status: ACTIVE | COMMUNITY

## 2024-04-30 RX ORDER — NORTRIPTYLINE HYDROCHLORIDE 25 MG/1
1 CAPSULE CAPSULE ORAL ONCE A DAY
Status: ON HOLD | COMMUNITY

## 2024-04-30 RX ORDER — SACUBITRIL AND VALSARTAN 49; 51 MG/1; MG/1
1 TABLET TABLET, FILM COATED ORAL TWICE A DAY
Status: ACTIVE | COMMUNITY

## 2024-04-30 RX ORDER — ROSUVASTATIN CALCIUM 40 MG/1
1 TABLET TABLET, COATED ORAL ONCE A DAY
Status: ACTIVE | COMMUNITY

## 2024-04-30 RX ORDER — SPIRONOLACTONE 25 MG/1
1 TABLET TABLET, FILM COATED ORAL
Status: ACTIVE | COMMUNITY

## 2024-04-30 RX ORDER — LORATADINE 10 MG
AS DIRECTED TABLET,DISINTEGRATING ORAL ONCE A DAY
Status: ON HOLD | COMMUNITY

## 2024-04-30 RX ORDER — DAPAGLIFLOZIN 10 MG/1
1 TABLET TABLET, FILM COATED ORAL ONCE A DAY
Status: ACTIVE | COMMUNITY

## 2024-04-30 RX ORDER — TIZANIDINE HYDROCHLORIDE 4 MG/1
1 CAPSULE AS NEEDED CAPSULE, GELATIN COATED ORAL THREE TIMES A DAY
Status: ACTIVE | COMMUNITY

## 2024-04-30 NOTE — HPI-OTHER HISTORIES
EGD (9/23/2021): irregular Z-line and nonerosive gastropathy.  Gastric biopsy showed foveolar hyperplasia, negative for H. pylori. EGD (9/23/2021): irregular Z-line and nonerosive gastropathy.  Gastric biopsy showed foveolar hyperplasia, negative for H. pylori.  Colonoscopy (12/11/2023, abdominal pain and diarrhea). The findings were notable for removal of a 5 mm hepatic flexure tubular adenoma but otherwise normal small bowel and colon status post biopsies of the colon negative for microscopic colitis.

## 2024-04-30 NOTE — HPI-TODAY'S VISIT:
He underwent an EGD on 4/10/2024 with findings notable for a mild Schatzki's ring status post dilation with an 18 mm Savary dilator, nonerosive gastropathy status post biopsy chemical gastropathy negative for Helicobacter pylori, normal duodenum.

## 2024-04-30 NOTE — HPI-TODAY'S VISIT:
Mr. Schreiber is a 63-year-old gentleman with past medical history significant for coronary artery disease status post MI and PTCA with stents, atrial fibrillation on Xarelto, and cholecystectomy presenting for follow-up

## 2024-05-17 ENCOUNTER — TELEPHONE ENCOUNTER (OUTPATIENT)
Dept: URBAN - METROPOLITAN AREA CLINIC 113 | Facility: CLINIC | Age: 64
End: 2024-05-17

## 2024-06-01 ENCOUNTER — TELEPHONE ENCOUNTER (OUTPATIENT)
Dept: URBAN - METROPOLITAN AREA CLINIC 113 | Facility: CLINIC | Age: 64
End: 2024-06-01

## 2024-08-07 ENCOUNTER — DASHBOARD ENCOUNTERS (OUTPATIENT)
Age: 64
End: 2024-08-07

## 2024-08-07 ENCOUNTER — OFFICE VISIT (OUTPATIENT)
Dept: URBAN - METROPOLITAN AREA CLINIC 113 | Facility: CLINIC | Age: 64
End: 2024-08-07
Payer: MEDICARE

## 2024-08-07 VITALS
HEIGHT: 67 IN | BODY MASS INDEX: 24.27 KG/M2 | TEMPERATURE: 97.5 F | RESPIRATION RATE: 18 BRPM | WEIGHT: 154.6 LBS | DIASTOLIC BLOOD PRESSURE: 75 MMHG | SYSTOLIC BLOOD PRESSURE: 120 MMHG | HEART RATE: 82 BPM

## 2024-08-07 DIAGNOSIS — K62.89 PROCTALGIA: ICD-10-CM

## 2024-08-07 DIAGNOSIS — K21.9 GASTROESOPHAGEAL REFLUX DISEASE WITHOUT ESOPHAGITIS: ICD-10-CM

## 2024-08-07 DIAGNOSIS — K59.04 CHRONIC IDIOPATHIC CONSTIPATION: ICD-10-CM

## 2024-08-07 PROCEDURE — 99214 OFFICE O/P EST MOD 30 MIN: CPT | Performed by: INTERNAL MEDICINE

## 2024-08-07 RX ORDER — ESOMEPRAZOLE MAGNESIUM 40 MG/1
1 CAPSULE CAPSULE, DELAYED RELEASE ORAL TWICE DAILY
OUTPATIENT
Start: 2021-11-05

## 2024-08-07 RX ORDER — ESOMEPRAZOLE MAGNESIUM 40 MG/1
1 CAPSULE CAPSULE, DELAYED RELEASE ORAL TWICE DAILY
Qty: 60 | Refills: 11 | Status: ACTIVE | COMMUNITY
Start: 2021-11-05

## 2024-08-07 RX ORDER — TIZANIDINE HYDROCHLORIDE 4 MG/1
1 CAPSULE AS NEEDED CAPSULE, GELATIN COATED ORAL THREE TIMES A DAY
Status: ACTIVE | COMMUNITY

## 2024-08-07 RX ORDER — ESOMEPRAZOLE MAGNESIUM 40 MG/1
1 CAPSULE CAPSULE, DELAYED RELEASE ORAL ONCE A DAY
Status: ON HOLD | COMMUNITY

## 2024-08-07 RX ORDER — LORATADINE 10 MG
AS DIRECTED TABLET,DISINTEGRATING ORAL ONCE A DAY
Status: ON HOLD | COMMUNITY

## 2024-08-07 RX ORDER — SACUBITRIL AND VALSARTAN 24; 26 MG/1; MG/1
1 TABLET TABLET, FILM COATED ORAL TWICE A DAY
Status: ACTIVE | COMMUNITY

## 2024-08-07 RX ORDER — NORTRIPTYLINE HYDROCHLORIDE 25 MG/1
1 CAPSULE CAPSULE ORAL ONCE A DAY
Status: ON HOLD | COMMUNITY

## 2024-08-07 RX ORDER — ROSUVASTATIN CALCIUM 40 MG/1
1 TABLET TABLET, COATED ORAL ONCE A DAY
Status: ACTIVE | COMMUNITY

## 2024-08-07 RX ORDER — RIVAROXABAN 2.5 MG/1
1 TABLET TABLET, FILM COATED ORAL TWICE A DAY
Status: ACTIVE | COMMUNITY

## 2024-08-07 RX ORDER — DAPAGLIFLOZIN 10 MG/1
1 TABLET TABLET, FILM COATED ORAL ONCE A DAY
Status: ACTIVE | COMMUNITY

## 2024-08-07 RX ORDER — SPIRONOLACTONE 25 MG/1
1 TABLET TABLET, FILM COATED ORAL
Status: ACTIVE | COMMUNITY

## 2024-08-07 NOTE — HPI-OTHER HISTORIES
EGD on 4/10/2024 with findings notable for a mild Schatzki's ring status post dilation with an 18 mm Savary dilator, nonerosive gastropathy status post biopsy chemical gastropathy negative for Helicobacter pylori, normal duodenum. Labs on 3/6/2024 notable for WBC 5.52, hemoglobin 15.9, MCV 91, platelets 269; sodium 139, potassium 4.5, chloride 104, CO2 26.7, BUN 14, creatinine 1.1; total bilirubin 0.4, alkaline phosphatase 74, AST 34, ALT 70, albumin 4.7, total protein 7.3.  Abdominal ultrasound (10/17/2023) cholelithiasis with mild gallbladder wall thickening, increased hepatic echotexture consistent with hepatic steatosis, normal caliber common bile duct (4.8 mm)  EGD (9/23/2021): irregular Z-line and nonerosive gastropathy.  Gastric biopsy showed foveolar hyperplasia, negative for H. pylori. EGD (9/23/2021): irregular Z-line and nonerosive gastropathy.  Gastric biopsy showed foveolar hyperplasia, negative for H. pylori.  Colonoscopy (12/11/2023, abdominal pain and diarrhea). The findings were notable for removal of a 5 mm hepatic flexure tubular adenoma but otherwise normal small bowel and colon status post biopsies of the colon negative for microscopic colitis.

## 2024-08-07 NOTE — HPI-TODAY'S VISIT:
Mr. Schreiber is a 63-year-old gentleman with past medical history significant for coronary artery disease status post MI and PTCA with stents, atrial fibrillation on Xarelto, and cholecystectomy presenting for follow-up.  He was last seen in the office on 4/30/2024 for follow-up regarding GERD, esophageal dysphagia, slow transit constipation and elevated liver enzymes.  He had a recent EGD that was unremarkable except for mild Schatzki's ring status post dilation.  Despite taking esomeprazole 40 mg twice daily, he continued to report of cough.  He declined a trial of Xyzal pending MRI of his brain.  His constipation was well-controlled on MiraLAX.  Labs showed a mild elevation in liver enzymes felt to be secondary to nonalcoholic fatty liver disease.  He reports having occasional chest discomfort without any obvious trigger.  No heartburn or difficulty swallowing.  He reports that he has been having a bowel movement about every other day that is fairly normal consistency taking MiraLAX.  No blood per rectum.  He has had pain in his "tailbone" and has been very bothersome and exacerbated by sitting.  He reports the pain is been chronic.  He does not recall any trauma such as a fall.  He is undergoing cystoscopy for the evaluation of hematuria.  His CT abdomen pelvis (6/7/2024) was negative except for constipation and to kidney stones, large prostate.

## 2024-08-21 PROBLEM — 77880009: Status: ACTIVE | Noted: 2024-08-21

## 2024-08-21 PROBLEM — 82934008: Status: ACTIVE | Noted: 2024-08-21

## 2024-08-23 ENCOUNTER — TELEPHONE ENCOUNTER (OUTPATIENT)
Dept: URBAN - METROPOLITAN AREA CLINIC 113 | Facility: CLINIC | Age: 64
End: 2024-08-23

## 2024-09-05 ENCOUNTER — TELEPHONE ENCOUNTER (OUTPATIENT)
Dept: URBAN - METROPOLITAN AREA CLINIC 113 | Facility: CLINIC | Age: 64
End: 2024-09-05

## 2024-09-24 ENCOUNTER — TELEPHONE ENCOUNTER (OUTPATIENT)
Dept: URBAN - METROPOLITAN AREA CLINIC 113 | Facility: CLINIC | Age: 64
End: 2024-09-24

## 2024-09-24 RX ORDER — POLYETHYLENE GLYCOL 3350, SODIUM SULFATE ANHYDROUS, SODIUM BICARBONATE, SODIUM CHLORIDE, POTASSIUM CHLORIDE 236; 22.74; 6.74; 5.86; 2.97 G/4L; G/4L; G/4L; G/4L; G/4L
AS DIRECTED POWDER, FOR SOLUTION ORAL ONCE
Qty: 1 | Refills: 0 | OUTPATIENT
Start: 2024-09-29 | End: 2024-09-30

## 2024-09-29 PROBLEM — 442182001: Status: ACTIVE | Noted: 2024-09-29

## 2024-10-14 ENCOUNTER — TELEPHONE ENCOUNTER (OUTPATIENT)
Dept: URBAN - METROPOLITAN AREA SURGERY CENTER 25 | Facility: SURGERY CENTER | Age: 64
End: 2024-10-14

## 2024-10-28 ENCOUNTER — TELEPHONE ENCOUNTER (OUTPATIENT)
Dept: URBAN - METROPOLITAN AREA CLINIC 113 | Facility: CLINIC | Age: 64
End: 2024-10-28

## 2024-10-29 ENCOUNTER — TELEPHONE ENCOUNTER (OUTPATIENT)
Dept: URBAN - METROPOLITAN AREA CLINIC 113 | Facility: CLINIC | Age: 64
End: 2024-10-29

## 2024-10-29 ENCOUNTER — OFFICE VISIT (OUTPATIENT)
Dept: URBAN - METROPOLITAN AREA SURGERY CENTER 25 | Facility: SURGERY CENTER | Age: 64
End: 2024-10-29

## 2024-11-04 ENCOUNTER — OFFICE VISIT (OUTPATIENT)
Dept: URBAN - METROPOLITAN AREA SURGERY CENTER 25 | Facility: SURGERY CENTER | Age: 64
End: 2024-11-04
Payer: COMMERCIAL

## 2024-11-04 DIAGNOSIS — K57.30 COLON, DIVERTICULOSIS: ICD-10-CM

## 2024-11-04 DIAGNOSIS — K64.0 FIRST DEGREE HEMORRHOIDS: ICD-10-CM

## 2024-11-04 DIAGNOSIS — R93.3 ABNORMAL FINDINGS ON DIAGNOSTIC IMAGING OF OTHER PARTS OF DIGESTIVE TRACT: ICD-10-CM

## 2024-11-04 DIAGNOSIS — R93.3 ABNORMAL FINDINGS ON DIAGNOSTIC IMAGING OF OTHER PARTS OF DIGESTIVE TRACT.: ICD-10-CM

## 2024-11-04 PROCEDURE — 00811 ANES LWR INTST NDSC NOS: CPT | Performed by: ANESTHESIOLOGY

## 2024-11-04 PROCEDURE — 00811 ANES LWR INTST NDSC NOS: CPT | Performed by: NURSE ANESTHETIST, CERTIFIED REGISTERED

## 2024-11-04 PROCEDURE — 45330 DIAGNOSTIC SIGMOIDOSCOPY: CPT | Performed by: INTERNAL MEDICINE

## 2024-11-04 RX ORDER — DAPAGLIFLOZIN 10 MG/1
1 TABLET TABLET, FILM COATED ORAL ONCE A DAY
Status: ACTIVE | COMMUNITY

## 2024-11-04 RX ORDER — SPIRONOLACTONE 25 MG/1
1 TABLET TABLET, FILM COATED ORAL
Status: ACTIVE | COMMUNITY

## 2024-11-04 RX ORDER — SACUBITRIL AND VALSARTAN 24; 26 MG/1; MG/1
1 TABLET TABLET, FILM COATED ORAL TWICE A DAY
Status: ACTIVE | COMMUNITY

## 2024-11-04 RX ORDER — NORTRIPTYLINE HYDROCHLORIDE 25 MG/1
1 CAPSULE CAPSULE ORAL ONCE A DAY
Status: ON HOLD | COMMUNITY

## 2024-11-04 RX ORDER — RIVAROXABAN 2.5 MG/1
1 TABLET TABLET, FILM COATED ORAL TWICE A DAY
Status: ACTIVE | COMMUNITY

## 2024-11-04 RX ORDER — ESOMEPRAZOLE MAGNESIUM 40 MG/1
1 CAPSULE CAPSULE, DELAYED RELEASE ORAL TWICE DAILY
Status: ACTIVE | COMMUNITY
Start: 2021-11-05

## 2024-11-04 RX ORDER — LORATADINE 10 MG
AS DIRECTED TABLET,DISINTEGRATING ORAL ONCE A DAY
Status: ON HOLD | COMMUNITY

## 2024-11-04 RX ORDER — ESOMEPRAZOLE MAGNESIUM 40 MG/1
1 CAPSULE CAPSULE, DELAYED RELEASE ORAL ONCE A DAY
Status: ON HOLD | COMMUNITY

## 2024-11-04 RX ORDER — TIZANIDINE HYDROCHLORIDE 4 MG/1
1 CAPSULE AS NEEDED CAPSULE, GELATIN COATED ORAL THREE TIMES A DAY
Status: ACTIVE | COMMUNITY

## 2024-11-04 RX ORDER — ROSUVASTATIN CALCIUM 40 MG/1
1 TABLET TABLET, COATED ORAL ONCE A DAY
Status: ACTIVE | COMMUNITY

## 2024-11-14 ENCOUNTER — OFFICE VISIT (OUTPATIENT)
Dept: URBAN - METROPOLITAN AREA SURGERY CENTER 25 | Facility: SURGERY CENTER | Age: 64
End: 2024-11-14

## 2024-11-21 ENCOUNTER — OFFICE VISIT (OUTPATIENT)
Dept: URBAN - METROPOLITAN AREA CLINIC 113 | Facility: CLINIC | Age: 64
End: 2024-11-21

## 2024-11-21 VITALS
RESPIRATION RATE: 12 BRPM | WEIGHT: 156 LBS | HEART RATE: 64 BPM | HEIGHT: 67 IN | BODY MASS INDEX: 24.48 KG/M2 | DIASTOLIC BLOOD PRESSURE: 61 MMHG | TEMPERATURE: 98.2 F | SYSTOLIC BLOOD PRESSURE: 109 MMHG

## 2024-11-21 RX ORDER — DAPAGLIFLOZIN 10 MG/1
1 TABLET TABLET, FILM COATED ORAL ONCE A DAY
Status: ACTIVE | COMMUNITY

## 2024-11-21 RX ORDER — CYCLOBENZAPRINE HYDROCHLORIDE 10 MG/1
1 TABLET AT BEDTIME AS NEEDED TABLET, FILM COATED ORAL ONCE A DAY
Status: ACTIVE | COMMUNITY

## 2024-11-21 RX ORDER — SPIRONOLACTONE 25 MG/1
1 TABLET TABLET, FILM COATED ORAL
Status: ACTIVE | COMMUNITY

## 2024-11-21 RX ORDER — SACUBITRIL AND VALSARTAN 24; 26 MG/1; MG/1
1 TABLET TABLET, FILM COATED ORAL TWICE A DAY
Status: ACTIVE | COMMUNITY

## 2024-11-21 RX ORDER — ROSUVASTATIN CALCIUM 40 MG/1
1 TABLET TABLET, COATED ORAL ONCE A DAY
Status: ACTIVE | COMMUNITY

## 2024-11-21 RX ORDER — ESOMEPRAZOLE MAGNESIUM 40 MG/1
1 CAPSULE CAPSULE, DELAYED RELEASE ORAL TWICE DAILY
Status: ON HOLD | COMMUNITY
Start: 2021-11-05

## 2024-11-21 RX ORDER — METOPROLOL SUCCINATE 25 MG/1
1 TABLET TABLET, FILM COATED, EXTENDED RELEASE ORAL ONCE A DAY
Status: ACTIVE | COMMUNITY

## 2024-11-21 RX ORDER — TIZANIDINE HYDROCHLORIDE 4 MG/1
1 CAPSULE AS NEEDED CAPSULE, GELATIN COATED ORAL THREE TIMES A DAY
Status: ON HOLD | COMMUNITY

## 2024-11-21 RX ORDER — RIVAROXABAN 2.5 MG/1
1 TABLET TABLET, FILM COATED ORAL TWICE A DAY
Status: ACTIVE | COMMUNITY

## 2024-11-21 RX ORDER — NORTRIPTYLINE HYDROCHLORIDE 25 MG/1
1 CAPSULE CAPSULE ORAL ONCE A DAY
Status: ON HOLD | COMMUNITY

## 2024-11-21 RX ORDER — LORATADINE 10 MG
AS DIRECTED TABLET,DISINTEGRATING ORAL ONCE A DAY
Status: ACTIVE | COMMUNITY

## 2024-11-21 RX ORDER — ESOMEPRAZOLE MAGNESIUM 40 MG/1
1 CAPSULE CAPSULE, DELAYED RELEASE ORAL ONCE A DAY
Status: ON HOLD | COMMUNITY

## 2024-11-21 NOTE — HPI-TODAY'S VISIT:
Mr. Schreiber is a 64-year-old gentleman with past medical history significant for coronary artery disease status post MI and PTCA with stents, atrial fibrillation on Xarelto, and cholecystectomy presenting for follow-up after flexible sigmoidoscopy.

## 2025-01-31 ENCOUNTER — OFFICE VISIT (OUTPATIENT)
Dept: URBAN - METROPOLITAN AREA CLINIC 113 | Facility: CLINIC | Age: 65
End: 2025-01-31
Payer: COMMERCIAL

## 2025-01-31 VITALS
RESPIRATION RATE: 16 BRPM | HEART RATE: 84 BPM | BODY MASS INDEX: 24.33 KG/M2 | WEIGHT: 155 LBS | HEIGHT: 67 IN | SYSTOLIC BLOOD PRESSURE: 111 MMHG | TEMPERATURE: 97.7 F | DIASTOLIC BLOOD PRESSURE: 71 MMHG

## 2025-01-31 DIAGNOSIS — K62.89 PROCTALGIA: ICD-10-CM

## 2025-01-31 DIAGNOSIS — R14.1 ABDOMINAL GAS PAIN: ICD-10-CM

## 2025-01-31 DIAGNOSIS — R79.89 ELEVATED LFTS: ICD-10-CM

## 2025-01-31 DIAGNOSIS — K59.01 SLOW TRANSIT CONSTIPATION: ICD-10-CM

## 2025-01-31 DIAGNOSIS — K21.9 GASTROESOPHAGEAL REFLUX DISEASE WITHOUT ESOPHAGITIS: ICD-10-CM

## 2025-01-31 DIAGNOSIS — R10.13 EPIGASTRIC ABDOMINAL PAIN: ICD-10-CM

## 2025-01-31 PROCEDURE — 99214 OFFICE O/P EST MOD 30 MIN: CPT

## 2025-01-31 RX ORDER — ESOMEPRAZOLE MAGNESIUM 40 MG/1
1 CAPSULE CAPSULE, DELAYED RELEASE ORAL TWICE DAILY
Status: ACTIVE | COMMUNITY
Start: 2021-11-05

## 2025-01-31 RX ORDER — RIVAROXABAN 2.5 MG/1
1 TABLET TABLET, FILM COATED ORAL TWICE A DAY
Status: ACTIVE | COMMUNITY

## 2025-01-31 RX ORDER — SACUBITRIL AND VALSARTAN 24; 26 MG/1; MG/1
1 TABLET TABLET, FILM COATED ORAL TWICE A DAY
Status: ACTIVE | COMMUNITY

## 2025-01-31 RX ORDER — LORATADINE 10 MG
AS DIRECTED TABLET,DISINTEGRATING ORAL ONCE A DAY
Status: ACTIVE | COMMUNITY

## 2025-01-31 RX ORDER — DAPAGLIFLOZIN 10 MG/1
1 TABLET TABLET, FILM COATED ORAL ONCE A DAY
Status: ACTIVE | COMMUNITY

## 2025-01-31 RX ORDER — ROSUVASTATIN CALCIUM 40 MG/1
1 TABLET TABLET, COATED ORAL ONCE A DAY
Status: ACTIVE | COMMUNITY

## 2025-01-31 RX ORDER — SPIRONOLACTONE 25 MG/1
1 TABLET TABLET, FILM COATED ORAL
Status: ACTIVE | COMMUNITY

## 2025-01-31 RX ORDER — CYCLOBENZAPRINE HYDROCHLORIDE 10 MG/1
1 TABLET AT BEDTIME AS NEEDED TABLET, FILM COATED ORAL ONCE A DAY
Status: ACTIVE | COMMUNITY

## 2025-01-31 RX ORDER — METOPROLOL SUCCINATE 25 MG/1
1 TABLET TABLET, FILM COATED, EXTENDED RELEASE ORAL ONCE A DAY
Status: ACTIVE | COMMUNITY

## 2025-01-31 NOTE — HPI-TODAY'S VISIT:
[facility]    Mr. Schreiber is a 64-year-old gentleman with past medical history significant for coronary artery disease status post MI and PTCA with stents, atrial fibrillation on Xarelto, CHF on Entresto and cholecystectomy presenting for evaluation of elevated liver enzymes.  He was last in office on 11/21/2024  regarding chronic  Proctalgia.  MRI of the pelvis showed some mild rectal wall thickening which was likely related to constipation as follow-up flexible sigmoidoscopy did not identify any mucosal abnormality.  He reports his constipation is well-controlled MiraLAX daily.  GERD symptoms fairly well-controlled on esomeprazole 40 mg twice daily.  He reported no benefit after trial of plasma.  Labs available for review from 9/24/2025 show normal CBC, hemoglobin A1c 6.6%, AST 54, ALT 91, alkaline phosphatase 49, total bilirubin 0.8.  Today he reports he was taken to his cardiologist office to the hospital. He reports he had an abdominal ultrasound this morning at [facility]. He had blood work at his PCP office a couple days. He is still having issues with rectal pain, he had an xray and reports his PCP said he had arthritis on this, and is referring him to a orthopedic office. He reports a sensation of his heart fluttering and feels it might be related to gas. troponin  Was then further elevated at 12.  ProBNP was elevated as well. Given the concern for non-ST elevation MI, he was transferred to [facility] for further management. Given markedly elevated lactic acid as well, he was placed in ICU here at [facility].        Hospital Course   Rudy Beth [facility]    [address]  [address]  [place] [address]  [phone]     JAMI MCGOVERN MD

## 2025-02-03 ENCOUNTER — TELEPHONE ENCOUNTER (OUTPATIENT)
Dept: URBAN - METROPOLITAN AREA CLINIC 113 | Facility: CLINIC | Age: 65
End: 2025-02-03

## 2025-03-14 ENCOUNTER — OFFICE VISIT (OUTPATIENT)
Dept: URBAN - METROPOLITAN AREA CLINIC 113 | Facility: CLINIC | Age: 65
End: 2025-03-14

## 2025-03-19 ENCOUNTER — TELEPHONE ENCOUNTER (OUTPATIENT)
Dept: URBAN - METROPOLITAN AREA CLINIC 113 | Facility: CLINIC | Age: 65
End: 2025-03-19

## 2025-04-01 ENCOUNTER — OFFICE VISIT (OUTPATIENT)
Dept: URBAN - METROPOLITAN AREA CLINIC 113 | Facility: CLINIC | Age: 65
End: 2025-04-01
Payer: COMMERCIAL

## 2025-04-01 DIAGNOSIS — K59.01 SLOW TRANSIT CONSTIPATION: ICD-10-CM

## 2025-04-01 DIAGNOSIS — K62.89 PROCTALGIA: ICD-10-CM

## 2025-04-01 DIAGNOSIS — R14.1 ABDOMINAL GAS PAIN: ICD-10-CM

## 2025-04-01 DIAGNOSIS — K21.9 GASTROESOPHAGEAL REFLUX DISEASE WITHOUT ESOPHAGITIS: ICD-10-CM

## 2025-04-01 DIAGNOSIS — R79.89 ELEVATED LFTS: ICD-10-CM

## 2025-04-01 PROCEDURE — 99214 OFFICE O/P EST MOD 30 MIN: CPT

## 2025-04-01 RX ORDER — ROSUVASTATIN CALCIUM 40 MG/1
1 TABLET TABLET, COATED ORAL ONCE A DAY
Status: ACTIVE | COMMUNITY

## 2025-04-01 RX ORDER — METOPROLOL SUCCINATE 25 MG/1
1 TABLET TABLET, FILM COATED, EXTENDED RELEASE ORAL ONCE A DAY
Status: ACTIVE | COMMUNITY

## 2025-04-01 RX ORDER — SPIRONOLACTONE 25 MG/1
1 TABLET TABLET, FILM COATED ORAL
Status: ACTIVE | COMMUNITY

## 2025-04-01 RX ORDER — CYCLOBENZAPRINE HYDROCHLORIDE 10 MG/1
1 TABLET AT BEDTIME AS NEEDED TABLET, FILM COATED ORAL ONCE A DAY
Status: ACTIVE | COMMUNITY

## 2025-04-01 RX ORDER — ESOMEPRAZOLE MAGNESIUM 40 MG/1
1 CAPSULE CAPSULE, DELAYED RELEASE ORAL TWICE DAILY
Status: ACTIVE | COMMUNITY
Start: 2021-11-05

## 2025-04-01 RX ORDER — ACETAMINOPHEN AND CODEINE PHOSPHATE 300; 30 MG/1; MG/1
1 TABLET AS NEEDED TABLET ORAL
Status: ACTIVE | COMMUNITY

## 2025-04-01 RX ORDER — SACUBITRIL AND VALSARTAN 24; 26 MG/1; MG/1
1 TABLET TABLET, FILM COATED ORAL TWICE A DAY
Status: ACTIVE | COMMUNITY

## 2025-04-01 RX ORDER — LORATADINE 10 MG
AS DIRECTED TABLET,DISINTEGRATING ORAL ONCE A DAY
Status: ACTIVE | COMMUNITY

## 2025-04-01 RX ORDER — DAPAGLIFLOZIN 10 MG/1
1 TABLET TABLET, FILM COATED ORAL ONCE A DAY
Status: ACTIVE | COMMUNITY

## 2025-04-01 RX ORDER — DOXYCYCLINE 40 MG/1
1 CAPSULE IN THE MORNING ON AN EMPTY STOMACH CAPSULE ORAL ONCE A DAY
Status: ACTIVE | COMMUNITY

## 2025-04-01 RX ORDER — RIVAROXABAN 2.5 MG/1
1 TABLET TABLET, FILM COATED ORAL TWICE A DAY
Status: ACTIVE | COMMUNITY

## 2025-04-01 NOTE — HPI-TODAY'S VISIT:
Mr. Schreiber is a 64-year-old gentleman with a past medical history significant for coronary artery disease status post MI and PTCA with stents, atrial fibrillation on Xarelto, CHF on Entresto and cholecystectomy presenting for follow-up regarding elevated liver enzymes.  He was last seen office on 1/31/2025 for evaluation of elevated liver enzymes.  Review of labs show elevations of transaminases.  Review of records reveal he was seen at [facility] on 1/24.  Negative chest x-ray and CT of the chest that showed bronchial wall thickening consistent with chronic or acute bronchitis, lumbar spine showed mild degenerative arthritis.  He was seen by his PCP 2 days prior to our visit labs from 1/24 show AST 54, ALT 91.  Plan for repeat LFTs in 4 to 6 weeks.  He reports he had had an abdominal ultrasound that morning at [facility] this was to be requested for review.  It appeared his PCP had also ordered VIJAYA and hepatitis panel these labs were requested further recommendations pending review of labs and imaging.  Regarding his rectal pain, consider this could be musculoskeletal etiology given recent imaging showed degenerative arthritis.  He will follow-up with PCP and orthopedist.  Increase in gas and bloating describes sensation of heart fluttering he reports that his cardiologist has plans to refer him to have a defibrillator placed.  These records will be requested.  He will trial FD guard until completion of cardiac workup.  Repeat LFTs from 3/25/2025 show normalization of LFTs, and Fib4  with absence of advanced liver fibrosis.  Today he reports he feels okay overall. He is still having some epigastric discomfort. He also has some palpitations. He was scheduled to have pacemaker placed, however he had a possible dental infection and was recommended to f/u with his dentist regarding this. They have planned to extract one of his teeth, and he would need to hold his blood thinner. He is having some difficulty swallowing. Denies nausea, vomiting, or fevers. Bowels are moving regularly withou blood per retum or melena.

## 2025-04-06 LAB
ACTIN (SMOOTH MUSCLE) ANTIBODY (IGG): <20
ALPHA-1-ANTITRYPSIN (AAT) PHENOTYPE: (no result)
MITOCHONDRIAL (M2) ANTIBODY: <=20

## 2025-05-11 ENCOUNTER — ERX REFILL RESPONSE (OUTPATIENT)
Dept: URBAN - METROPOLITAN AREA CLINIC 113 | Facility: CLINIC | Age: 65
End: 2025-05-11

## 2025-05-11 RX ORDER — ESOMEPRAZOLE MAGNESIUM 40 MG/1
TAKE ONE CAPSULE BY MOUTH TWICE A DAY CAPSULE, DELAYED RELEASE ORAL
Qty: 60 CAPSULE | Refills: 11 | OUTPATIENT

## 2025-05-11 RX ORDER — ESOMEPRAZOLE MAGNESIUM 40 MG/1
1 CAPSULE CAPSULE, DELAYED RELEASE ORAL TWICE DAILY
OUTPATIENT